# Patient Record
Sex: MALE | Race: ASIAN | Employment: UNEMPLOYED | URBAN - METROPOLITAN AREA
[De-identification: names, ages, dates, MRNs, and addresses within clinical notes are randomized per-mention and may not be internally consistent; named-entity substitution may affect disease eponyms.]

---

## 2019-04-08 ENCOUNTER — OFFICE VISIT (OUTPATIENT)
Dept: FAMILY MEDICINE CLINIC | Age: 39
End: 2019-04-08
Payer: MEDICAID

## 2019-04-08 VITALS
RESPIRATION RATE: 20 BRPM | WEIGHT: 189.8 LBS | OXYGEN SATURATION: 98 % | SYSTOLIC BLOOD PRESSURE: 128 MMHG | HEIGHT: 70 IN | TEMPERATURE: 99.8 F | HEART RATE: 75 BPM | DIASTOLIC BLOOD PRESSURE: 82 MMHG | BODY MASS INDEX: 27.17 KG/M2

## 2019-04-08 DIAGNOSIS — Z79.899 LONG TERM CURRENT USE OF DIURETIC: ICD-10-CM

## 2019-04-08 DIAGNOSIS — G43.009 MIGRAINE WITHOUT AURA AND WITHOUT STATUS MIGRAINOSUS, NOT INTRACTABLE: ICD-10-CM

## 2019-04-08 DIAGNOSIS — Z77.21 EXPOSURE TO POTENTIALLY HAZARDOUS BODY FLUIDS: ICD-10-CM

## 2019-04-08 DIAGNOSIS — Z13.1 SCREENING FOR DIABETES MELLITUS: ICD-10-CM

## 2019-04-08 DIAGNOSIS — Z13.220 SCREENING, LIPID: ICD-10-CM

## 2019-04-08 LAB
ANION GAP SERPL CALCULATED.3IONS-SCNC: 16 MMOL/L (ref 3–16)
BUN BLDV-MCNC: 15 MG/DL (ref 7–20)
CALCIUM SERPL-MCNC: 9.3 MG/DL (ref 8.3–10.6)
CHLORIDE BLD-SCNC: 101 MMOL/L (ref 99–110)
CHOLESTEROL, FASTING: 237 MG/DL (ref 0–199)
CO2: 23 MMOL/L (ref 21–32)
CREAT SERPL-MCNC: 0.9 MG/DL (ref 0.9–1.3)
GFR AFRICAN AMERICAN: >60
GFR NON-AFRICAN AMERICAN: >60
GLUCOSE BLD-MCNC: 90 MG/DL (ref 70–99)
HDLC SERPL-MCNC: 43 MG/DL (ref 40–60)
LDL CHOLESTEROL CALCULATED: 152 MG/DL
POTASSIUM SERPL-SCNC: 4.5 MMOL/L (ref 3.5–5.1)
REASON FOR REJECTION: NORMAL
REJECTED TEST: NORMAL
SODIUM BLD-SCNC: 140 MMOL/L (ref 136–145)
TRIGLYCERIDE, FASTING: 209 MG/DL (ref 0–150)
VLDLC SERPL CALC-MCNC: 42 MG/DL

## 2019-04-08 PROCEDURE — 99203 OFFICE O/P NEW LOW 30 MIN: CPT | Performed by: FAMILY MEDICINE

## 2019-04-08 PROCEDURE — 1036F TOBACCO NON-USER: CPT | Performed by: FAMILY MEDICINE

## 2019-04-08 PROCEDURE — G8427 DOCREV CUR MEDS BY ELIG CLIN: HCPCS | Performed by: FAMILY MEDICINE

## 2019-04-08 PROCEDURE — 36415 COLL VENOUS BLD VENIPUNCTURE: CPT | Performed by: FAMILY MEDICINE

## 2019-04-08 PROCEDURE — G8419 CALC BMI OUT NRM PARAM NOF/U: HCPCS | Performed by: FAMILY MEDICINE

## 2019-04-08 RX ORDER — LISINOPRIL AND HYDROCHLOROTHIAZIDE 25; 20 MG/1; MG/1
1 TABLET ORAL DAILY
COMMUNITY
End: 2019-05-13

## 2019-04-08 RX ORDER — FLUOXETINE HYDROCHLORIDE 20 MG/1
10 CAPSULE ORAL DAILY
COMMUNITY
End: 2020-09-17 | Stop reason: ALTCHOICE

## 2019-04-08 RX ORDER — FLUTICASONE PROPIONATE 50 MCG
2 SPRAY, SUSPENSION (ML) NASAL DAILY
Qty: 1 BOTTLE | Refills: 5 | Status: SHIPPED | OUTPATIENT
Start: 2019-04-08

## 2019-04-08 RX ORDER — QUETIAPINE 200 MG/1
200 TABLET, FILM COATED, EXTENDED RELEASE ORAL NIGHTLY
COMMUNITY
End: 2019-04-23

## 2019-04-08 RX ORDER — CETIRIZINE HYDROCHLORIDE 10 MG/1
10 TABLET ORAL DAILY
COMMUNITY

## 2019-04-08 RX ORDER — SUMATRIPTAN 100 MG/1
TABLET, FILM COATED ORAL
Qty: 9 TABLET | Refills: 3 | Status: SHIPPED | OUTPATIENT
Start: 2019-04-08

## 2019-04-08 RX ORDER — FLUOXETINE 10 MG/1
10 CAPSULE ORAL DAILY
COMMUNITY
End: 2019-04-23 | Stop reason: CLARIF

## 2019-04-08 RX ORDER — FLUTICASONE PROPIONATE 50 MCG
1 SPRAY, SUSPENSION (ML) NASAL DAILY
COMMUNITY
End: 2019-05-13

## 2019-04-08 SDOH — HEALTH STABILITY: MENTAL HEALTH: HOW OFTEN DO YOU HAVE A DRINK CONTAINING ALCOHOL?: MONTHLY OR LESS

## 2019-04-08 SDOH — HEALTH STABILITY: PHYSICAL HEALTH: ON AVERAGE, HOW MANY DAYS PER WEEK DO YOU ENGAGE IN MODERATE TO STRENUOUS EXERCISE (LIKE A BRISK WALK)?: 5 DAYS

## 2019-04-08 SDOH — HEALTH STABILITY: MENTAL HEALTH: HOW MANY STANDARD DRINKS CONTAINING ALCOHOL DO YOU HAVE ON A TYPICAL DAY?: 1 OR 2

## 2019-04-08 ASSESSMENT — PATIENT HEALTH QUESTIONNAIRE - PHQ9: DEPRESSION UNABLE TO ASSESS: PT REFUSES

## 2019-04-08 NOTE — PROGRESS NOTES
Subjective:      Patient ID: John Jo is a 45 y.o. male. HPI  44 yo man with migraines for years but more freq ( 2-3 / mo) since he returned to [facility]. Unilateral, R orbital, no aura, + nausea, phono and photophobia. Uses Exedrin and occ more Tylenol    Has hx of nasal allergies  Gets tension headaches in the back of the head as well but the migraines are distinct  Review of Systems  PMSHx reviewed and/or updated    Objective:   Physical Exam   Constitutional: He appears well-developed and well-nourished. HENT:   Right Ear: Tympanic membrane and ear canal normal.   Left Ear: Tympanic membrane and ear canal normal.   Nose: Nose normal. Right sinus exhibits no maxillary sinus tenderness and no frontal sinus tenderness. Left sinus exhibits no maxillary sinus tenderness and no frontal sinus tenderness. Mouth/Throat: Uvula is midline, oropharynx is clear and moist and mucous membranes are normal.   Invisalign   Eyes: Pupils are equal, round, and reactive to light. Conjunctivae and EOM are normal.   Neurological: He has normal strength. No cranial nerve deficit. He displays a negative Romberg sign. Reflex Scores:       Tricep reflexes are 2+ on the right side and 2+ on the left side. Bicep reflexes are 2+ on the right side and 2+ on the left side. Brachioradialis reflexes are 2+ on the right side and 2+ on the left side. Patellar reflexes are 2+ on the right side and 2+ on the left side.   Cerebellar fn good     Vitals:    04/08/19 1112   BP: 128/82   Site: Left Upper Arm   Position: Sitting   Cuff Size: Medium Adult   Pulse: 75   Resp: 20   Temp: 99.8 °F (37.7 °C)   TempSrc: Oral   SpO2: 98%   Weight: 189 lb 12.8 oz (86.1 kg)   Height: 5' 9.5\" (1.765 m)         Assessment:    Common Migraines    HTN, controlled  Allergic rhinitis      Plan:      Imitrex 100 mg at migraine onset, then 100 mg 2 hrs later  Return for the completion of the exam  Refill other meds as needed        [employer] Fior Martinez MD

## 2019-04-08 NOTE — PATIENT INSTRUCTIONS
Imitrex 100 mg at migraine onset, then 100 mg 2 hrs later  Return for the completion of the exam  Labs   Start Imitrex as needed   refill Flonase

## 2019-04-09 DIAGNOSIS — Z77.21 EXPOSURE TO POTENTIALLY HAZARDOUS BODY FLUIDS: ICD-10-CM

## 2019-04-10 LAB
HIV AG/AB: NORMAL
HIV ANTIGEN: NORMAL
HIV-1 ANTIBODY: NORMAL
HIV-2 AB: NORMAL
HSV 1 GLYCOPROTEIN G AB IGG: 46.6 IV
HSV 2 GLYCOPROTEIN G AB IGG: 0.64 IV

## 2019-04-23 ENCOUNTER — OFFICE VISIT (OUTPATIENT)
Dept: FAMILY MEDICINE CLINIC | Age: 39
End: 2019-04-23
Payer: MEDICAID

## 2019-04-23 VITALS
HEART RATE: 84 BPM | SYSTOLIC BLOOD PRESSURE: 130 MMHG | DIASTOLIC BLOOD PRESSURE: 80 MMHG | OXYGEN SATURATION: 98 % | BODY MASS INDEX: 27.51 KG/M2 | WEIGHT: 189 LBS

## 2019-04-23 DIAGNOSIS — E78.2 MIXED HYPERLIPIDEMIA: ICD-10-CM

## 2019-04-23 DIAGNOSIS — Z77.21 EXPOSURE TO POTENTIALLY HAZARDOUS BODY FLUIDS: ICD-10-CM

## 2019-04-23 DIAGNOSIS — E78.2 MIXED HYPERLIPIDEMIA: Primary | ICD-10-CM

## 2019-04-23 LAB — C-REACTIVE PROTEIN, HIGH SENSITIVITY: 0.75 MG/L (ref 0.16–3)

## 2019-04-23 PROCEDURE — 36415 COLL VENOUS BLD VENIPUNCTURE: CPT | Performed by: FAMILY MEDICINE

## 2019-04-23 PROCEDURE — 99395 PREV VISIT EST AGE 18-39: CPT | Performed by: FAMILY MEDICINE

## 2019-04-23 RX ORDER — EMTRICITABINE AND TENOFOVIR DISOPROXIL FUMARATE 200; 300 MG/1; MG/1
1 TABLET, FILM COATED ORAL DAILY
Qty: 30 TABLET | Refills: 3 | Status: SHIPPED | OUTPATIENT
Start: 2019-04-23 | End: 2019-08-27 | Stop reason: SDUPTHER

## 2019-04-23 RX ORDER — CARIPRAZINE 1.5 MG/1
1.5 CAPSULE, GELATIN COATED ORAL DAILY
COMMUNITY
Start: 2019-04-16 | End: 2020-12-09

## 2019-04-23 ASSESSMENT — PATIENT HEALTH QUESTIONNAIRE - PHQ9
1. LITTLE INTEREST OR PLEASURE IN DOING THINGS: 0
SUM OF ALL RESPONSES TO PHQ QUESTIONS 1-9: 0
SUM OF ALL RESPONSES TO PHQ QUESTIONS 1-9: 0
SUM OF ALL RESPONSES TO PHQ9 QUESTIONS 1 & 2: 0
2. FEELING DOWN, DEPRESSED OR HOPELESS: 0

## 2019-04-23 NOTE — PATIENT INSTRUCTIONS
Get labs  Start Truvada  QVar 40, 1 puff twice daily  Call with a report in 1-2 weeks  Lipid profile in 2-3 mos.

## 2019-04-23 NOTE — PROGRESS NOTES
Subjective:      Patient ID: Fátima Villalba is a 45 y.o. male. HPI  Started Vraylar 8 days ago, and his mood has stabilized. Lately has awakened with wheezing and some resistance to air flow  Review of Systems   Constitutional:        WDWN WM in NAD   Musculoskeletal: Positive for arthralgias (R index finger for the last month). Hematological: Bruises/bleeds easily. Psychiatric/Behavioral: Positive for agitation. Negative for dysphoric mood. Low libido, attrib to Prozac     PMSHx reviewed and/or updated    Objective:   Physical Exam   Constitutional: He is oriented to person, place, and time. He appears well-developed and well-nourished. HENT:   Head: Normocephalic. Right Ear: Tympanic membrane, external ear and ear canal normal.   Left Ear: Tympanic membrane, external ear and ear canal normal.   Nose: Nose normal.   Mouth/Throat: Uvula is midline, oropharynx is clear and moist and mucous membranes are normal. No oropharyngeal exudate, posterior oropharyngeal edema or posterior oropharyngeal erythema. Eyes: Pupils are equal, round, and reactive to light. Conjunctivae and EOM are normal.   Neck: Normal range of motion. Neck supple. Carotid bruit is not present. No thyromegaly present. Cardiovascular: Normal rate, regular rhythm and normal heart sounds. No murmur heard. Pulmonary/Chest: Effort normal and breath sounds normal.   Abdominal: Soft. Bowel sounds are normal. He exhibits no mass. There is no hepatosplenomegaly. There is no tenderness. No hernia. Hernia confirmed negative in the right inguinal area and confirmed negative in the left inguinal area. Genitourinary: Rectum normal, prostate normal, testes normal and penis normal.   Musculoskeletal: Normal range of motion. Joints stable and strong   Lymphadenopathy:     He has no cervical adenopathy. Neurological: He is alert and oriented to person, place, and time. He has normal strength and normal reflexes. No cranial nerve deficit. He displays a negative Romberg sign. Skin: Skin is warm and dry. No rash noted. Psychiatric: He has a normal mood and affect. His behavior is normal.       Assessment:    Well man, contemplating a return to the dating pool        Plan:     Get labs  Start Truvada  QVar 40, 1 puff twice daily  Call with a report in 1-2 weeks  Lipid profile in 2-3 mos.         Danielito Alan MD

## 2019-04-24 LAB — TOTAL SYPHILLIS IGG/IGM: NORMAL

## 2019-05-13 ENCOUNTER — OFFICE VISIT (OUTPATIENT)
Dept: FAMILY MEDICINE CLINIC | Age: 39
End: 2019-05-13
Payer: MEDICAID

## 2019-05-13 VITALS
BODY MASS INDEX: 27.8 KG/M2 | DIASTOLIC BLOOD PRESSURE: 88 MMHG | HEART RATE: 75 BPM | WEIGHT: 191 LBS | SYSTOLIC BLOOD PRESSURE: 138 MMHG

## 2019-05-13 DIAGNOSIS — N62 GYNECOMASTIA, MALE: Primary | ICD-10-CM

## 2019-05-13 DIAGNOSIS — N62 GYNECOMASTIA, MALE: ICD-10-CM

## 2019-05-13 LAB
ALBUMIN SERPL-MCNC: 5 G/DL (ref 3.4–5)
ALP BLD-CCNC: 86 U/L (ref 40–129)
ALT SERPL-CCNC: 30 U/L (ref 10–40)
ANISOCYTOSIS: ABNORMAL
AST SERPL-CCNC: 31 U/L (ref 15–37)
BASOPHILS ABSOLUTE: 0 K/UL (ref 0–0.2)
BASOPHILS RELATIVE PERCENT: 0.2 %
BILIRUB SERPL-MCNC: 0.4 MG/DL (ref 0–1)
BILIRUBIN DIRECT: <0.2 MG/DL (ref 0–0.3)
BILIRUBIN, INDIRECT: NORMAL MG/DL (ref 0–1)
EOSINOPHILS ABSOLUTE: 0.1 K/UL (ref 0–0.6)
EOSINOPHILS RELATIVE PERCENT: 1.7 %
HCT VFR BLD CALC: 38.7 % (ref 40.5–52.5)
HEMATOLOGY PATH CONSULT: YES
HEMOGLOBIN: 12.5 G/DL (ref 13.5–17.5)
LYMPHOCYTES ABSOLUTE: 1.5 K/UL (ref 1–5.1)
LYMPHOCYTES RELATIVE PERCENT: 31.6 %
MCH RBC QN AUTO: 22 PG (ref 26–34)
MCHC RBC AUTO-ENTMCNC: 32.2 G/DL (ref 31–36)
MCV RBC AUTO: 68.3 FL (ref 80–100)
MICROCYTES: ABNORMAL
MONOCYTES ABSOLUTE: 0.3 K/UL (ref 0–1.3)
MONOCYTES RELATIVE PERCENT: 7 %
NEUTROPHILS ABSOLUTE: 2.8 K/UL (ref 1.7–7.7)
NEUTROPHILS RELATIVE PERCENT: 59.5 %
PDW BLD-RTO: 16.9 % (ref 12.4–15.4)
PLATELET # BLD: 181 K/UL (ref 135–450)
PLATELET SLIDE REVIEW: ADEQUATE
PMV BLD AUTO: 9.2 FL (ref 5–10.5)
PROLACTIN: 11.5 NG/ML
RBC # BLD: 5.66 M/UL (ref 4.2–5.9)
SLIDE REVIEW: ABNORMAL
TEAR DROP CELLS: ABNORMAL
TOTAL PROTEIN: 8 G/DL (ref 6.4–8.2)
WBC # BLD: 4.7 K/UL (ref 4–11)

## 2019-05-13 PROCEDURE — 36415 COLL VENOUS BLD VENIPUNCTURE: CPT | Performed by: FAMILY MEDICINE

## 2019-05-13 PROCEDURE — G8428 CUR MEDS NOT DOCUMENT: HCPCS | Performed by: FAMILY MEDICINE

## 2019-05-13 PROCEDURE — G8419 CALC BMI OUT NRM PARAM NOF/U: HCPCS | Performed by: FAMILY MEDICINE

## 2019-05-13 PROCEDURE — 99213 OFFICE O/P EST LOW 20 MIN: CPT | Performed by: FAMILY MEDICINE

## 2019-05-13 PROCEDURE — 1036F TOBACCO NON-USER: CPT | Performed by: FAMILY MEDICINE

## 2019-05-13 RX ORDER — IRBESARTAN AND HYDROCHLOROTHIAZIDE 300; 12.5 MG/1; MG/1
1 TABLET, FILM COATED ORAL DAILY
Qty: 30 TABLET | Refills: 5 | Status: SHIPPED | OUTPATIENT
Start: 2019-05-13 | End: 2019-06-14

## 2019-05-13 NOTE — PROGRESS NOTES
Subjective:      Patient ID: Gianna Fernando is a 45 y.o. male. HPI  Has had large breasts since adolescence, even when he was lean. Review of Systems    Objective:   Physical Exam   Pulmonary/Chest:   Symmetrical enlargement of the breasts    Nontender.  Well defined bud on R, ill defined bud on L       Assessment:     Gynecomastia      Plan:      CBC, LFTs, Prolactin        Anastasia Alves MD

## 2019-05-14 DIAGNOSIS — D50.9 MICROCYTIC ANEMIA: Primary | ICD-10-CM

## 2019-05-14 DIAGNOSIS — D50.9 MICROCYTIC ANEMIA: ICD-10-CM

## 2019-05-14 LAB
HEMATOLOGY PATH CONSULT: NORMAL
IRON SATURATION: 31 % (ref 20–50)
IRON: 99 UG/DL (ref 59–158)
PROLACTIN: 12.2 NG/ML
TOTAL IRON BINDING CAPACITY: 317 UG/DL (ref 260–445)

## 2019-05-21 RX ORDER — FINASTERIDE 1 MG/1
1 TABLET, FILM COATED ORAL DAILY
Qty: 30 TABLET | Refills: 5 | Status: SHIPPED | OUTPATIENT
Start: 2019-05-21 | End: 2019-10-11

## 2019-06-03 ENCOUNTER — NURSE ONLY (OUTPATIENT)
Dept: FAMILY MEDICINE CLINIC | Age: 39
End: 2019-06-03

## 2019-06-03 ENCOUNTER — TELEPHONE (OUTPATIENT)
Dept: FAMILY MEDICINE CLINIC | Age: 39
End: 2019-06-03

## 2019-06-03 VITALS — DIASTOLIC BLOOD PRESSURE: 100 MMHG | SYSTOLIC BLOOD PRESSURE: 148 MMHG

## 2019-06-03 RX ORDER — AMLODIPINE BESYLATE 5 MG/1
5 TABLET ORAL DAILY
Qty: 30 TABLET | Refills: 5 | Status: SHIPPED | OUTPATIENT
Start: 2019-06-03 | End: 2019-10-11

## 2019-06-03 NOTE — TELEPHONE ENCOUNTER
Pt here for BP check, 148/100 X2  Pt states that he received a letter from [employer] stating that the medication you sent in for him was not on his formulary. He states that losartan was. Please advise.

## 2019-06-03 NOTE — TELEPHONE ENCOUNTER
I called in another medicine called amlodipine to take in addition to the medicine you are currently taking.  I hope there is another drug other than losartan that your insurance covers because losartan I believe does not last for a full 24 hours

## 2019-06-12 ENCOUNTER — TELEPHONE (OUTPATIENT)
Dept: FAMILY MEDICINE CLINIC | Age: 39
End: 2019-06-12

## 2019-06-13 ENCOUNTER — TELEPHONE (OUTPATIENT)
Dept: FAMILY MEDICINE CLINIC | Age: 39
End: 2019-06-13

## 2019-06-13 NOTE — TELEPHONE ENCOUNTER
Pt called to say that  He spoke with the Pharmacy today and the Irbesartan  Is not going to approved by the Pharmacy please advise.     :  Ema Tinsley 61, [address] [facility]

## 2019-06-14 ENCOUNTER — TELEPHONE (OUTPATIENT)
Dept: FAMILY MEDICINE CLINIC | Age: 39
End: 2019-06-14

## 2019-06-14 RX ORDER — LISINOPRIL AND HYDROCHLOROTHIAZIDE 25; 20 MG/1; MG/1
1 TABLET ORAL DAILY
Qty: 30 TABLET | Refills: 5 | Status: SHIPPED | OUTPATIENT
Start: 2019-06-14 | End: 2019-10-11

## 2019-06-14 NOTE — TELEPHONE ENCOUNTER
The blood pressure medication I prescribed with not a preferred drug on your insurance company's formulary so I switched it to something that was. The new medicine, lisinopril is very effective at controlling blood pressure but in rather large minority of patients it can cause a cough. If you develop a dry tickly cough in the back of your throat is probably due to the medication.   Either way I would like to see you in the office in 2 to 3 weeks for a blood pressure check

## 2019-08-27 RX ORDER — EMTRICITABINE AND TENOFOVIR DISOPROXIL FUMARATE 200; 300 MG/1; MG/1
TABLET, FILM COATED ORAL
Qty: 30 TABLET | Refills: 2 | Status: SHIPPED | OUTPATIENT
Start: 2019-08-27 | End: 2019-10-11

## 2019-10-11 ENCOUNTER — OFFICE VISIT (OUTPATIENT)
Dept: PRIMARY CARE CLINIC | Age: 39
End: 2019-10-11
Payer: MEDICAID

## 2019-10-11 VITALS
HEART RATE: 75 BPM | WEIGHT: 205 LBS | BODY MASS INDEX: 29.84 KG/M2 | SYSTOLIC BLOOD PRESSURE: 120 MMHG | DIASTOLIC BLOOD PRESSURE: 80 MMHG

## 2019-10-11 DIAGNOSIS — Z23 NEEDS FLU SHOT: Primary | ICD-10-CM

## 2019-10-11 PROCEDURE — G8419 CALC BMI OUT NRM PARAM NOF/U: HCPCS | Performed by: FAMILY MEDICINE

## 2019-10-11 PROCEDURE — 99214 OFFICE O/P EST MOD 30 MIN: CPT | Performed by: FAMILY MEDICINE

## 2019-10-11 PROCEDURE — G8427 DOCREV CUR MEDS BY ELIG CLIN: HCPCS | Performed by: FAMILY MEDICINE

## 2019-10-11 PROCEDURE — 90686 IIV4 VACC NO PRSV 0.5 ML IM: CPT | Performed by: FAMILY MEDICINE

## 2019-10-11 PROCEDURE — 1036F TOBACCO NON-USER: CPT | Performed by: FAMILY MEDICINE

## 2019-10-11 PROCEDURE — G8482 FLU IMMUNIZE ORDER/ADMIN: HCPCS | Performed by: FAMILY MEDICINE

## 2019-10-11 PROCEDURE — 90471 IMMUNIZATION ADMIN: CPT | Performed by: FAMILY MEDICINE

## 2019-10-11 RX ORDER — FINASTERIDE 1 MG/1
1 TABLET, FILM COATED ORAL DAILY
Qty: 90 TABLET | Refills: 3 | Status: SHIPPED | OUTPATIENT
Start: 2019-10-11 | End: 2020-09-17 | Stop reason: ALTCHOICE

## 2019-10-11 RX ORDER — EMTRICITABINE AND TENOFOVIR DISOPROXIL FUMARATE 200; 300 MG/1; MG/1
1 TABLET, FILM COATED ORAL DAILY
Qty: 90 TABLET | Refills: 3 | Status: SHIPPED | OUTPATIENT
Start: 2019-10-11 | End: 2020-09-17 | Stop reason: ALTCHOICE

## 2019-10-11 RX ORDER — CLONIDINE HYDROCHLORIDE 0.1 MG/1
0.1 TABLET ORAL 2 TIMES DAILY
COMMUNITY
Start: 2019-10-10 | End: 2020-08-25 | Stop reason: SDUPTHER

## 2019-10-11 RX ORDER — AMLODIPINE BESYLATE 5 MG/1
5 TABLET ORAL DAILY
Qty: 90 TABLET | Refills: 1 | Status: SHIPPED | OUTPATIENT
Start: 2019-10-11 | End: 2020-12-09

## 2019-10-11 RX ORDER — VALSARTAN AND HYDROCHLOROTHIAZIDE 160; 25 MG/1; MG/1
1 TABLET ORAL DAILY
Qty: 30 TABLET | Refills: 0 | Status: SHIPPED | OUTPATIENT
Start: 2019-10-11 | End: 2019-10-15

## 2019-10-14 RX ORDER — AMLODIPINE BESYLATE 5 MG/1
TABLET ORAL
Qty: 90 TABLET | Refills: 1 | Status: SHIPPED | OUTPATIENT
Start: 2019-10-14 | End: 2020-08-25 | Stop reason: SDUPTHER

## 2019-10-15 RX ORDER — LOSARTAN POTASSIUM AND HYDROCHLOROTHIAZIDE 25; 100 MG/1; MG/1
1 TABLET ORAL DAILY
Qty: 30 TABLET | Refills: 2 | Status: SHIPPED | OUTPATIENT
Start: 2019-10-15 | End: 2020-08-25 | Stop reason: SDUPTHER

## 2020-08-25 ENCOUNTER — E-VISIT (OUTPATIENT)
Dept: PRIMARY CARE CLINIC | Age: 40
End: 2020-08-25
Payer: MEDICAID

## 2020-08-25 PROCEDURE — 99421 OL DIG E/M SVC 5-10 MIN: CPT | Performed by: FAMILY MEDICINE

## 2020-08-25 RX ORDER — AMLODIPINE BESYLATE 5 MG/1
5 TABLET ORAL DAILY
Qty: 90 TABLET | Refills: 0 | Status: SHIPPED | OUTPATIENT
Start: 2020-08-25 | End: 2021-01-11 | Stop reason: SDUPTHER

## 2020-08-25 RX ORDER — LOSARTAN POTASSIUM AND HYDROCHLOROTHIAZIDE 25; 100 MG/1; MG/1
1 TABLET ORAL DAILY
Qty: 90 TABLET | Refills: 0 | Status: SHIPPED | OUTPATIENT
Start: 2020-08-25 | End: 2020-12-09

## 2020-08-25 RX ORDER — CLONIDINE HYDROCHLORIDE 0.1 MG/1
0.1 TABLET ORAL 2 TIMES DAILY
Qty: 180 TABLET | Refills: 0 | Status: SHIPPED | OUTPATIENT
Start: 2020-08-25 | End: 2020-12-09

## 2020-08-26 NOTE — PROGRESS NOTES
I will refill his blood pressure medication, and send it to [facility] in [place] on [address]

## 2020-09-17 ENCOUNTER — OFFICE VISIT (OUTPATIENT)
Dept: PRIMARY CARE CLINIC | Age: 40
End: 2020-09-17
Payer: MEDICAID

## 2020-09-17 VITALS
TEMPERATURE: 97.6 F | DIASTOLIC BLOOD PRESSURE: 87 MMHG | SYSTOLIC BLOOD PRESSURE: 136 MMHG | WEIGHT: 218 LBS | BODY MASS INDEX: 31.73 KG/M2 | HEART RATE: 79 BPM

## 2020-09-17 DIAGNOSIS — K52.9 CHRONIC DIARRHEA OF UNKNOWN ORIGIN: ICD-10-CM

## 2020-09-17 LAB
ALBUMIN SERPL-MCNC: 4.6 G/DL (ref 3.4–5)
ALP BLD-CCNC: 91 U/L (ref 40–129)
ALT SERPL-CCNC: 23 U/L (ref 10–40)
AST SERPL-CCNC: 23 U/L (ref 15–37)
BASOPHILS ABSOLUTE: 0 K/UL (ref 0–0.2)
BASOPHILS RELATIVE PERCENT: 0.6 %
BILIRUB SERPL-MCNC: 0.3 MG/DL (ref 0–1)
BILIRUBIN DIRECT: <0.2 MG/DL (ref 0–0.3)
BILIRUBIN, INDIRECT: NORMAL MG/DL (ref 0–1)
EOSINOPHILS ABSOLUTE: 0.1 K/UL (ref 0–0.6)
EOSINOPHILS RELATIVE PERCENT: 1.8 %
HCT VFR BLD CALC: 39.3 % (ref 40.5–52.5)
HEMATOLOGY PATH CONSULT: NO
HEMOGLOBIN: 12.3 G/DL (ref 13.5–17.5)
LYMPHOCYTES ABSOLUTE: 1.7 K/UL (ref 1–5.1)
LYMPHOCYTES RELATIVE PERCENT: 30.5 %
MCH RBC QN AUTO: 21.1 PG (ref 26–34)
MCHC RBC AUTO-ENTMCNC: 31.3 G/DL (ref 31–36)
MCV RBC AUTO: 67.5 FL (ref 80–100)
MONOCYTES ABSOLUTE: 0.3 K/UL (ref 0–1.3)
MONOCYTES RELATIVE PERCENT: 5.3 %
NEUTROPHILS ABSOLUTE: 3.4 K/UL (ref 1.7–7.7)
NEUTROPHILS RELATIVE PERCENT: 61.8 %
PDW BLD-RTO: 15.6 % (ref 12.4–15.4)
PLATELET # BLD: 177 K/UL (ref 135–450)
PMV BLD AUTO: 9 FL (ref 5–10.5)
RBC # BLD: 5.82 M/UL (ref 4.2–5.9)
TOTAL PROTEIN: 7.3 G/DL (ref 6.4–8.2)
WBC # BLD: 5.5 K/UL (ref 4–11)

## 2020-09-17 PROCEDURE — 1036F TOBACCO NON-USER: CPT | Performed by: FAMILY MEDICINE

## 2020-09-17 PROCEDURE — G8417 CALC BMI ABV UP PARAM F/U: HCPCS | Performed by: FAMILY MEDICINE

## 2020-09-17 PROCEDURE — 99214 OFFICE O/P EST MOD 30 MIN: CPT | Performed by: FAMILY MEDICINE

## 2020-09-17 PROCEDURE — G8427 DOCREV CUR MEDS BY ELIG CLIN: HCPCS | Performed by: FAMILY MEDICINE

## 2020-09-17 RX ORDER — ZOLPIDEM TARTRATE 10 MG/1
10 TABLET ORAL
COMMUNITY
Start: 2020-07-30 | End: 2021-02-04 | Stop reason: SDUPTHER

## 2020-09-17 RX ORDER — ELUXADOLINE 100 MG/1
100 TABLET, FILM COATED ORAL 2 TIMES DAILY
Qty: 60 TABLET | Refills: 2 | Status: SHIPPED | OUTPATIENT
Start: 2020-09-17 | End: 2020-09-22

## 2020-09-17 RX ORDER — ESCITALOPRAM OXALATE 10 MG/1
20 TABLET ORAL
COMMUNITY
Start: 2020-02-03 | End: 2021-02-04

## 2020-09-17 SDOH — ECONOMIC STABILITY: FOOD INSECURITY: WITHIN THE PAST 12 MONTHS, YOU WORRIED THAT YOUR FOOD WOULD RUN OUT BEFORE YOU GOT MONEY TO BUY MORE.: NEVER TRUE

## 2020-09-17 SDOH — ECONOMIC STABILITY: FOOD INSECURITY: WITHIN THE PAST 12 MONTHS, THE FOOD YOU BOUGHT JUST DIDN'T LAST AND YOU DIDN'T HAVE MONEY TO GET MORE.: NEVER TRUE

## 2020-09-17 SDOH — ECONOMIC STABILITY: INCOME INSECURITY: HOW HARD IS IT FOR YOU TO PAY FOR THE VERY BASICS LIKE FOOD, HOUSING, MEDICAL CARE, AND HEATING?: NOT HARD AT ALL

## 2020-09-17 ASSESSMENT — PATIENT HEALTH QUESTIONNAIRE - PHQ9
1. LITTLE INTEREST OR PLEASURE IN DOING THINGS: 0
SUM OF ALL RESPONSES TO PHQ QUESTIONS 1-9: 0
2. FEELING DOWN, DEPRESSED OR HOPELESS: 0
SUM OF ALL RESPONSES TO PHQ9 QUESTIONS 1 & 2: 0
SUM OF ALL RESPONSES TO PHQ QUESTIONS 1-9: 0

## 2020-09-17 NOTE — PROGRESS NOTES
Subjective:      Patient ID: Freddie Sy is a 44 y.o. male. HPI  Has had rectal bleeding off and on for years, had a neg colonoscopy last year. Episodes will last for ~ 1 month then stop. This one has been persisting for~ 3 weeks, no pain with defecation. Has used PPI    Bloating , belching,upper abd discomfort 4 day. Sl N only. Fecal urgency and frequency for 4 days. Has tried gas-x, Pepcid, and a laxative, but no better    Review of Systems    Objective:   Physical Exam  Constitutional:       Appearance: Normal appearance. He is normal weight. HENT:      Mouth/Throat:      Lips: Pink. Mouth: Mucous membranes are moist.      Tongue: No lesions. Pharynx: Oropharynx is clear. Cardiovascular:      Rate and Rhythm: Normal rate and regular rhythm. Heart sounds: No murmur. Pulmonary:      Effort: Pulmonary effort is normal.      Breath sounds: Normal breath sounds. Abdominal:      General: Bowel sounds are decreased. Palpations: Abdomen is soft. There is no hepatomegaly or splenomegaly. Tenderness: There is abdominal tenderness in the epigastric area. Genitourinary:     Prostate: Normal.      Rectum: Internal hemorrhoid present.       Comments: Anoscopy reveals int hemorrhoids      Vitals:    09/17/20 0850   BP: 136/87   Pulse: 79   Temp: 97.6 °F (36.4 °C)   Weight: 218 lb (98.9 kg)         Assessment:    Chronic loose stools  Internal hemorrhoids, bleeding  Exclude malabsorption      Plan:    LFTs  CBC  Transmaminase  *Beano 2 tabs with each meal for 3 days  Viberzi 100 mg twice daily  Refer GI         Darren Gottron, MD

## 2020-09-22 ENCOUNTER — TELEPHONE (OUTPATIENT)
Dept: PRIMARY CARE CLINIC | Age: 40
End: 2020-09-22

## 2020-09-22 RX ORDER — DIPHENOXYLATE HYDROCHLORIDE AND ATROPINE SULFATE 2.5; .025 MG/1; MG/1
1-2 TABLET ORAL 4 TIMES DAILY PRN
Qty: 60 TABLET | Refills: 0 | Status: SHIPPED | OUTPATIENT
Start: 2020-09-22 | End: 2020-10-07

## 2020-09-23 NOTE — TELEPHONE ENCOUNTER
Before your insurance will pay for Viberzi you have to try Lomotil or another generic first.  I called in some Lomotil for you, the dose is 1 or 2 tablets 4 times per day. If you need it for a longer period of time let me know.

## 2020-09-28 ENCOUNTER — OFFICE VISIT (OUTPATIENT)
Dept: ORTHOPEDIC SURGERY | Age: 40
End: 2020-09-28
Payer: MEDICAID

## 2020-09-28 VITALS — WEIGHT: 218.03 LBS | BODY MASS INDEX: 32.29 KG/M2 | HEIGHT: 69 IN | TEMPERATURE: 98.1 F

## 2020-09-28 PROCEDURE — G8417 CALC BMI ABV UP PARAM F/U: HCPCS | Performed by: INTERNAL MEDICINE

## 2020-09-28 PROCEDURE — 99203 OFFICE O/P NEW LOW 30 MIN: CPT | Performed by: INTERNAL MEDICINE

## 2020-09-28 PROCEDURE — G8427 DOCREV CUR MEDS BY ELIG CLIN: HCPCS | Performed by: INTERNAL MEDICINE

## 2020-09-28 PROCEDURE — 1036F TOBACCO NON-USER: CPT | Performed by: INTERNAL MEDICINE

## 2020-09-28 NOTE — PROGRESS NOTES
Chief Complaint:   Chief Complaint   Patient presents with    Ankle Pain     left, rolled ankle in March 2020, now pain w/running up to 7/10, medial ankle          History of Present Illness:       Patient is a 36 y.o. male presents with the above complaint. The symptoms began worsening t. Pain localizes to the Medial aspect and  is not predictably aggravated by weightbearing activity of navigating stairs and impact activity. There are not mechanical symptoms associated with the symptoms. There are notneuritic symptoms involving the foot or ankle. The patient admits to subjective instability about the foot or ankle and denies new onset or progressive weakness of the lower extremity. Pain level 7    The patient denies a pattern of activity related swelling. Treatment to date: Active rest avoidance of impact activity    There is prior history of foot or ankle trauma related to stress fracture of the heel. There is no prior history of autoimmune disease, crystal arthropathy, or crystal arthropathy. Past Medical History:        Past Medical History:   Diagnosis Date    Asthma 1999    exercise or allergens    Depression 01/2019    Fluoxetine    Essential hypertension 2003    Gastric ulcer 2019    H. pylori, had EGD, then H. pylori treated. Neg colonoscopy    Migraine 1998    Panic disorder with agoraphobia 01/2019    Thalassemia minor        No past surgical history on file. Present Medications:         Current Outpatient Medications   Medication Sig Dispense Refill    diphenoxylate-atropine (DIPHENATOL) 2.5-0.025 MG per tablet Take 1-2 tablets by mouth 4 times daily as needed for Diarrhea for up to 15 days.  60 tablet 0    escitalopram (LEXAPRO) 10 MG tablet 10 mg      zolpidem (AMBIEN) 10 MG tablet 10 mg.      losartan-hydroCHLOROthiazide (HYZAAR) 100-25 MG per tablet Take 1 tablet by mouth daily 90 tablet 0    amLODIPine (NORVASC) 5 MG tablet Take 1 tablet by mouth daily 90 tobacco: Never Used   Substance and Sexual Activity    Alcohol use: Yes     Frequency: Monthly or less     Drinks per session: 1 or 2     Binge frequency: Monthly    Drug use: Not Currently    Sexual activity: Not Currently     Partners: Male   Lifestyle    Physical activity     Days per week: 5 days     Minutes per session: Not on file    Stress: Not on file   Relationships    Social connections     Talks on phone: Not on file     Gets together: Not on file     Attends Mu-ism service: Not on file     Active member of club or organization: Not on file     Attends meetings of clubs or organizations: Not on file     Relationship status: Not on file    Intimate partner violence     Fear of current or ex partner: Not on file     Emotionally abused: Not on file     Physically abused: Not on file     Forced sexual activity: Not on file   Other Topics Concern    Not on file   Social History Narrative    [place], [place], [place], [address]    5 times per week in the gym, and cross fit        Review of Symptoms:    Pertinent items are noted in HPI    Review of systems reviewed from Patient History Form dated on today's date and   available in the patient's chart under the Media tab. Vital Signs:      Vitals:    09/28/20 1525   Temp: 98.1 °F (36.7 °C)        General Exam:     Constitutional: Patient is adequately groomed with no evidence of malnutrition  Mental Status: The patient is oriented to time, place and person. The patient's mood and affect are appropriate. Vascular: Examination reveals no swelling or calf tenderness. Peripheral pulses are palpable and 2+.     Lymphatics: no lymphadenopathy of the inguinal region or lower extremity      Physical Exam: left ankle      Primary Exam:    Inspection: Mild to moderate pes planus and pronation there is suggestion of mild asymmetric atrophy of the left gastrocnemius      Palpation: There is tenderness of the medial talus region      Range of Motion: Dorsiflexion 0 degrees bilaterally      Strength: Normal at the ankle      Special Tests: Bilateral heel rise good strength without pain; anterior drawer talar tilt stable the ankle stable to valgus stressing and symmetric      Skin: There are no rashes, ulcerations or lesions. Gait: Nonantalgic      Reflex intact lower     Additional Comments:        Additional Examinations:           Right Lower Extremity: Examination of the right lower extremity does not show any tenderness, deformity or injury. Range of motion is unremarkable. There is no gross instability. There are no rashes, ulcerations or lesions. Strength and tone are normal.   Neurolgic -Light touch sensation and manual muscle testing normal L2-S1. No fasiculations. Pattella tendon and Achilles tendon reflexes +2 bilaterally. Seated SLR negative        Office Imaging Results/Procedures PerformedToday:      Radiology:      X-rays obtained and reviewed in office:   Views 3 views left ankle   Location left ankle   Impression there is neutral foot morphology incidental note of plantar calcaneal spur. There is spurring dorsally from the neck of the talus on the lateral projection ankle mortise is anatomic. Joint space of the talocrural joint is normal.  Without evidence of discrete OCD lesion. .  No other soft tissue or osseous abnormalities       Office Procedures:     Orders Placed This Encounter   Procedures    XR ANKLE LEFT (MIN 3 VIEWS)     Standing Status:   Future     Number of Occurrences:   1     Standing Expiration Date:   9/28/2021     Order Specific Question:   Reason for exam:     Answer:   pain           Other Outside Imaging and Testing Personally Reviewed:    Xr Ankle Left (min 3 Views)    Result Date: 9/28/2020  Radiology exam is complete. No Radiologist dictation. Please follow up with ordering provider. Assessment   Impression: . Encounter Diagnoses   Name Primary?     Osteochondral talar dome lesion Yes

## 2020-10-13 ENCOUNTER — OFFICE VISIT (OUTPATIENT)
Dept: ORTHOPEDIC SURGERY | Age: 40
End: 2020-10-13
Payer: MEDICAID

## 2020-10-13 ENCOUNTER — TELEPHONE (OUTPATIENT)
Dept: ORTHOPEDIC SURGERY | Age: 40
End: 2020-10-13

## 2020-10-13 VITALS — TEMPERATURE: 97.6 F | BODY MASS INDEX: 32.29 KG/M2 | HEIGHT: 69 IN | WEIGHT: 218.03 LBS

## 2020-10-13 PROCEDURE — G8427 DOCREV CUR MEDS BY ELIG CLIN: HCPCS | Performed by: INTERNAL MEDICINE

## 2020-10-13 PROCEDURE — 1036F TOBACCO NON-USER: CPT | Performed by: INTERNAL MEDICINE

## 2020-10-13 PROCEDURE — G8484 FLU IMMUNIZE NO ADMIN: HCPCS | Performed by: INTERNAL MEDICINE

## 2020-10-13 PROCEDURE — L4360 PNEUMAT WALKING BOOT PRE CST: HCPCS | Performed by: INTERNAL MEDICINE

## 2020-10-13 PROCEDURE — G8417 CALC BMI ABV UP PARAM F/U: HCPCS | Performed by: INTERNAL MEDICINE

## 2020-10-13 PROCEDURE — 99213 OFFICE O/P EST LOW 20 MIN: CPT | Performed by: INTERNAL MEDICINE

## 2020-10-13 NOTE — PROGRESS NOTES
of Breath (Patient not taking: Reported on 9/17/2020) 1 Inhaler 0    VRAYLAR 1.5 MG capsule Take 1.5 mg by mouth daily      cetirizine (ZYRTEC ALLERGY) 10 MG tablet Take 10 mg by mouth daily      Multiple Vitamins-Minerals (MULTIVITAMIN ADULTS PO) Take 1 tablet by mouth daily      SUMAtriptan (IMITREX) 100 MG tablet 1 tab at migraine onset. May repeat in 2 hrs but don't exceed 2 per 24 hrs 9 tablet 3    fluticasone (FLONASE) 50 MCG/ACT nasal spray 2 sprays by Nasal route daily 1 Bottle 5     No current facility-administered medications for this visit. Allergies: Allergies   Allergen Reactions    Oxycontin [Oxycodone Hcl] Nausea Only    Percocet [Oxycodone-Acetaminophen] Nausea Only           Review of Systems:    Pertinent items are noted in HPI        Vital Signs:      Vitals:    10/13/20 1532   Temp: 97.6 °F (36.4 °C)        General Exam:     Constitutional: Patient is adequately groomed with no evidence of malnutrition    Physical Exam: left ankle      Primary Exam:    Inspection: No deformity atrophy appreciable effusion      Palpation: No focal tenderness      Range of Motion: Full range symmetric      Strength: Normal without pain      Special Tests: ATFL and CFL stable      Skin: There are no rashes, ulcerations or lesions.       Gait: Nonantalgic     Neurovascular - non focal and intact       Additional Comments:        Additional Examinations:                    Office Imaging Results/Procedures PerformedToday:            Office Procedures:     Orders Placed This Encounter   Procedures   Nichol Gilbert MD, Orthopedic Surgery (Foot, Ankle), [facility]     Referral Priority:   Routine     Referral Type:   Eval and Treat     Referral Reason:   Specialty Services Required     Referred to Provider:   Sae Malave MD     Requested Specialty:   Orthopedic Surgery     Number of Visits Requested:   1    Breg / Dirk [place] Walking Boot     Patient was prescribed a Tall Walking Boot. The left ankle will require stabilization / immobilization from this semi-rigid / rigid orthosis to improve their function. The orthosis will assist in protecting the affected area, provide functional support and facilitate healing. Patient was instructed to progress ambulation weight bearing as tolerated in the device. The patient was educated and fit by a healthcare professional with expert knowledge and specialization in brace application while under the direct supervision of the physician. Verbal and written instructions for the use of and application of this item were provided. They were instructed to contact the office immediately should the brace result in increased pain, decreased sensation, increased swelling or worsening of the condition. Other Outside Imaging and Testing Personally Reviewed:    Mri Lower Extremity W Jt Wo Contrast    Result Date: 10/10/2020  [facility] #: [id number] #: [id number] Procedure: MR Left Ankle w/o Contrast ; Reason for Exam: Dx, Talar dome osteochondral defect This document is confidential medical information. Unauthorized disclosure or use of this information is prohibited by law. If you are not the intended recipient of this document, please advise us by calling immediately [phone]. [facility], [facility] Willy Cartagena Patient Name: Cornelia Real Case ID: [id number] Patient : 1980 Referring Physician: Skyler Fleming MD Exam Date: 10/08/2020 Exam Description: MR Left Ankle w/o Contrast HISTORY:  OCD, pain medially. TECHNICAL FACTORS:  Long- and short-axis fat- and water-weighted images were performed. COMPARISON:  None. FINDINGS:  Scarred lateral ligament complexes. Tendinopathic distal tibialis posterior tendon. Fibers frayed. Os tibiale externum. Tibialis  anterior tendon intact. Peroneus longus and brevis tendons are intact. Achilles insertion intact.  Tibiotalar capsulitis. 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 The OCD 1.7  x 1.1 cm. Cartilage ulceration. The cortex slightly lifted. Subchondral pseudocysts and marrow reaction. No coalition. No soft tissue mass. CONCLUSION: 1. 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 Subcortical microfracturing and pseudocysts. Mild marrow reaction medial malleolus and nonweightbearing medial talus related to  repetitive microtrauma. 2. Scarred lateral ligament complex. No acute ankle sprain. 3. Tendinopathic and frayed distal tibialis posterior tendon. Tenosynovitis. 4. Please see above. Thank you for the opportunity to provide your interpretation. Wylie Najjar, MD A: AO/ct 10/10/2020 10:58 AM T: CT 10/10/2020 10:20 AM              Assessment   Impression: . Encounter Diagnosis   Name Primary?  Osteochondral talar dome lesion Yes              Plan: Active rest avoidance of impact activities  Brace boot immobilization PRN for escalating pain  Surgical consultation foot and ankle specialty surgery- for consideration of surgical intervention           Orders:        Orders Placed This Encounter   Procedures   Catalina Haddad MD, Orthopedic Surgery (Foot, Ankle), [facility]     Referral Priority:   Routine     Referral Type:   Eval and Treat     Referral Reason:   Specialty Services Required     Referred to Provider:   Joanna Ricketts MD     Requested Specialty:   Orthopedic Surgery     Number of Visits Requested:   1    Luz Maria / Mark Cardenas Walking Boot     Patient was prescribed a Tall Walking Boot. The left ankle will require stabilization / immobilization from this semi-rigid / rigid orthosis to improve their function. The orthosis will assist in protecting the affected area, provide functional support and facilitate healing. Patient was instructed to progress ambulation weight bearing as tolerated in the device.      The patient was educated and fit by a healthcare professional with expert knowledge and specialization in brace application while under the direct supervision of the physician. Verbal and written instructions for the use of and application of this item were provided. They were instructed to contact the office immediately should the brace result in increased pain, decreased sensation, increased swelling or worsening of the condition. Summer Díaz MD.      Disclaimer: \"This note was dictated with voice recognition software. Though review and correction are routine, we apologize for any errors. \"

## 2020-10-14 ENCOUNTER — OFFICE VISIT (OUTPATIENT)
Dept: ORTHOPEDIC SURGERY | Age: 40
End: 2020-10-14
Payer: MEDICAID

## 2020-10-14 VITALS — WEIGHT: 218 LBS | HEIGHT: 69 IN | BODY MASS INDEX: 32.29 KG/M2

## 2020-10-14 PROCEDURE — G8484 FLU IMMUNIZE NO ADMIN: HCPCS | Performed by: ORTHOPAEDIC SURGERY

## 2020-10-14 PROCEDURE — G8417 CALC BMI ABV UP PARAM F/U: HCPCS | Performed by: ORTHOPAEDIC SURGERY

## 2020-10-14 PROCEDURE — G8427 DOCREV CUR MEDS BY ELIG CLIN: HCPCS | Performed by: ORTHOPAEDIC SURGERY

## 2020-10-14 PROCEDURE — 1036F TOBACCO NON-USER: CPT | Performed by: ORTHOPAEDIC SURGERY

## 2020-10-14 PROCEDURE — 99204 OFFICE O/P NEW MOD 45 MIN: CPT | Performed by: ORTHOPAEDIC SURGERY

## 2020-10-14 NOTE — PROGRESS NOTES
Chief Complaint    New Patient (Left Ankle - Osteochondral talar dome lesion)      History of Present Illness:  Teofilo Zaldivar is a 36 y.o. male who I was asked to see in consultation by Dr. Macri Boykin for evaluation of left ankle pain. This started approximately June when he rolled his ankle. The symptoms are worse with activity and better with rest. Patient endorses anteromedial ankle pain but denies numbness tingling. Treatment to date includes: None. He does come in with x-rays and MRIs consistent with medial talar OCD lesion. The patient works as a marathon running . Medical History:  Patient's medications, allergies, past medical, surgical, social and family histories were reviewed and updated as appropriate. Review of Systems:  Relevant review of systems reviewed and available in the patient's chart. They are negative or noncontributory except as per HPI. Vital Signs: There were no vitals filed for this visit. General: well-developed, well-nourished  CV: RR  RESP: Respirations unlabored on RA  Skin: no rashes or ulcerations appreciated  Psych: appropriate mood and affect  Musculoskeletal:    Extremity: Left lower extremity    Inspection: No swelling or ecchymosis    Palpation: Tenderness to palpation about the anteromedial joint. No tenderness over posterior tibialis tendon. Range of Motion: Full ankle range of motion    Stability: Ankle stable    Strength: 5 out of 5 through    Special Tests: None    Gait: Normal    Pulse/perfusion: 2+ chest pedis pulse foot warm well perfused    Neurological:    Sensation: Sensation intact light touch throughout the left lower extremity    Radiology:     X-rays obtained and reviewed in office:  No new x-rays were obtained today but outside radiographs and MRI were reviewed. These show 1 x 1-1/2 cm medial talar OCD shoulder lesion with subchondral cyst formation is also appreciate on the radiographs.   There is also some bony edema in the medial malleolus and nonweightbearing portion of the medial talus. Assessment : 51-year-old male with painful OCD lesion of the left ankle the medial shoulder of the talus    Impression:  Encounter Diagnoses   Name Primary?  Acute left ankle pain Yes    Chronic pain of left ankle     Osteochondral talar dome lesion        Office Procedures:  Orders Placed This Encounter   Procedures    [facility]     Referral Priority:   Routine     Referral Type:   Eval and Treat     Referral Reason:   Specialty Services Required     Requested Specialty:   Physical Therapy     Number of Visits Requested:   1    [place] ARTHROCENTESIS ASPIR&/INJ INTERM JT/BURS W/O US     THIS WILL BE DONE @ [facility] IN THE RADIOLOGY DEPT IN THE [facility] ROOM    PATIENT WILL CALL TO SCHEDULE    INJECTION WILL BE 2cc OF LIDOCAINE AND 1cc OF KENALOG IN THE LEFT ANKLE BY THE MD       Treatment Plan:      I had a nice discussion with the patient today. This problem poses numerous issues related both the patient age and the location of the lesion. The other question here is the chronicity of the injury. He did roll his ankle in June but was not a particularly significant injury and he did not need to seek further care take any time off training. I do suspect that this may be an old injury that is now starting to become symptomatic potentially because of overuse and his high training regimen. Furthermore given his age and location of the lesion I am not confident that he would have an excellent result from a surgical standpoint. He does not have symptoms every day and he has no mechanical symptoms. Furthermore given the bony edema edema on the tibial and talar side of this I do wonder how much of this is more acute stress type reaction that may just be in fact unrelated to a likely older OCD lesion.     To that end I would like to see if we get the symptoms to calm down and then have him gradually return to activity and see if the symptoms resolve. We will do this through physical therapy activity modification to only low impact type activities, elliptical, cycling, walking, swimming. Additionally I would like to schedule him for a left ankle steroid injection. I think that that will help calm things down. Either way I would like to follow this to make sure that this lesion is not worsening. Depending on how he does we may have a discussion about surgery sooner rather than later but I did discuss with him that surgical treatment of shoulder lesions is particularly difficult and his outcome may be certainly variable. More likely I would like to follow the lesion and see if there is progression over 6-month period of time that would encourage sooner intervention at a later. Patient understands the natural history of difficulties in dealing this type of lesion in particular. I will see him back for the injection will be done under fluoroscopy and then likely follow-up with him about 4 to 6 weeks after that to see how he is doing. Greater than 45 minutes was spent in direct face-to-face discussion and evaluation of this complex patient problem.

## 2020-12-09 ENCOUNTER — OFFICE VISIT (OUTPATIENT)
Dept: PRIMARY CARE CLINIC | Age: 40
End: 2020-12-09
Payer: COMMERCIAL

## 2020-12-09 VITALS
BODY MASS INDEX: 33.03 KG/M2 | TEMPERATURE: 98 F | OXYGEN SATURATION: 96 % | RESPIRATION RATE: 12 BRPM | HEIGHT: 69 IN | SYSTOLIC BLOOD PRESSURE: 142 MMHG | HEART RATE: 99 BPM | DIASTOLIC BLOOD PRESSURE: 87 MMHG | WEIGHT: 223 LBS

## 2020-12-09 PROCEDURE — 90471 IMMUNIZATION ADMIN: CPT | Performed by: FAMILY MEDICINE

## 2020-12-09 PROCEDURE — 99213 OFFICE O/P EST LOW 20 MIN: CPT | Performed by: FAMILY MEDICINE

## 2020-12-09 PROCEDURE — 90686 IIV4 VACC NO PRSV 0.5 ML IM: CPT | Performed by: FAMILY MEDICINE

## 2020-12-09 RX ORDER — VALSARTAN AND HYDROCHLOROTHIAZIDE 320; 25 MG/1; MG/1
1 TABLET, FILM COATED ORAL DAILY
Qty: 90 TABLET | Refills: 3 | Status: SHIPPED | OUTPATIENT
Start: 2020-12-09 | End: 2021-03-02 | Stop reason: SDUPTHER

## 2020-12-09 NOTE — PROGRESS NOTES
Subjective:      Patient ID: Teofilo Zaldivar is a 36 y.o. male. HPI   For the last 6-7 years he'd drop his sugar after a meal. Will start to sweat, then  Shake, and feel LHed and irritable. Usually treatable with sugar, but 2 days ago the sensation recurred . Had a workup by endocrine in 2014-5, and all was nl    Review of Systems    Objective:   Physical Exam  Constitutional:       Appearance: Normal appearance. HENT:      Head: Normocephalic and atraumatic. Neck:      Thyroid: No thyromegaly. Vascular: No carotid bruit. Cardiovascular:      Rate and Rhythm: Normal rate and regular rhythm. Heart sounds: No murmur. Pulmonary:      Effort: Pulmonary effort is normal.      Breath sounds: Normal breath sounds. Abdominal:      General: Bowel sounds are normal.      Palpations: Abdomen is soft. There is no hepatomegaly or splenomegaly. Tenderness: There is no abdominal tenderness. Musculoskeletal:      Right lower leg: No edema. Left lower leg: No edema. Lymphadenopathy:      Cervical: No cervical adenopathy. Neurological:      Mental Status: He is alert.    Psychiatric:         Mood and Affect: Mood normal.         Behavior: Behavior normal.       Vitals:    12/09/20 1546   BP: (!) 142/87   Site: Left Upper Arm   Position: Sitting   Cuff Size: Large Adult   Pulse: 99   Resp: 12   Temp: 98 °F (36.7 °C)   TempSrc: Temporal   SpO2: 96%   Weight: 223 lb (101.2 kg)   Height: 5' 9\" (1.753 m)       Assessment:    reactive hypoglycemia        Plan:      Come to the office and try to provoke an attack, then get a glucose and an insulin level    In the meantime, always eat some protein with your carbs    Malorie Malhotra MD

## 2020-12-09 NOTE — PROGRESS NOTES
Vaccine Information Sheet, \"Influenza - Inactivated\"  given to Cornelia Real, or parent/legal guardian of  Cornelia Real and verbalized understanding. Patient responses:    Have you ever had a reaction to a flu vaccine? No  Are you able to eat eggs without adverse effects? Yes  Do you have any current illness? No  Have you ever had Guillian Strang Syndrome? No    Immunizations Administered     Name Date Dose Route    Influenza, Quadv, IM, PF (6 mo and older Fluzone, Flulaval, Fluarix, and 3 yrs and older Afluria) 12/9/2020 0.5 mL Intramuscular    Site: Deltoid- Left    Lot: [id number]    NDC: 69308-588-29          Flu vaccine given per order. Please see immunization tab. Patient instructed to remain in clinic for 20 minutes after injection and was advised to report any adverse reaction to me immediately.

## 2020-12-15 ENCOUNTER — TELEPHONE (OUTPATIENT)
Dept: PRIMARY CARE CLINIC | Age: 40
End: 2020-12-15

## 2020-12-15 NOTE — TELEPHONE ENCOUNTER
[employer] ask for PA to this med:    Eluxadoline (VIBERZI) 100 MG TABS [[id number]]  DISCONTINUED    Reference # [id number]

## 2020-12-17 ENCOUNTER — TELEPHONE (OUTPATIENT)
Dept: PRIMARY CARE CLINIC | Age: 40
End: 2020-12-17

## 2020-12-17 NOTE — TELEPHONE ENCOUNTER
Working on 0972 Paul Albarran has not recently prescribed this for the patient.  Went ahead and sent PA

## 2020-12-17 NOTE — TELEPHONE ENCOUNTER
April with [employer] called to check the status on the authorization  form that was faxed over on Dec 8th,    Eluxadoline (VIBERZI) 100 MG TABS [[id number]]  DISCONTINUED    Order Details  Dose: 100 mg Route: Oral Frequency: 2 TIMES DAILY   Dispense Quantity: 60 tablet Refills: 2          Sig: Take 100 mg by mouth 2 times daily for 90 days.             Thank you

## 2020-12-23 ENCOUNTER — OFFICE VISIT (OUTPATIENT)
Dept: PRIMARY CARE CLINIC | Age: 40
End: 2020-12-23
Payer: COMMERCIAL

## 2020-12-23 VITALS
DIASTOLIC BLOOD PRESSURE: 78 MMHG | OXYGEN SATURATION: 93 % | TEMPERATURE: 97.9 F | SYSTOLIC BLOOD PRESSURE: 126 MMHG | BODY MASS INDEX: 33.32 KG/M2 | WEIGHT: 225.6 LBS | HEART RATE: 104 BPM

## 2020-12-23 PROCEDURE — 99213 OFFICE O/P EST LOW 20 MIN: CPT | Performed by: FAMILY MEDICINE

## 2020-12-23 NOTE — PROGRESS NOTES
Subjective:      Patient ID: Kalyan Florez is a 36 y.o. male. HPINo hypoglycemia or LHedness  Rarely needs Ambien,hasn't had any in 2 mos  Review of Systems   Cardiovascular: Negative for palpitations. Neurological: Negative for light-headedness. PMSHx reviewed and/or updated    Objective:   Physical Exam  Cardiovascular:      Rate and Rhythm: Normal rate and regular rhythm. Pulmonary:      Effort: Pulmonary effort is normal.      Breath sounds: Normal breath sounds.        The 10-year ASCVD risk score (Muriel Jasso, et al., 2013) is: 1.9%    Values used to calculate the score:      Age: 36 years      Sex: Male      Is Non- : No      Diabetic: No      Tobacco smoker: No      Systolic Blood Pressure: 358 mmHg      Is BP treated: No      HDL Cholesterol: 43 mg/dL      Total Cholesterol: 237 mg/dL    Assessment:    Occasional insomnia  Hyperlipidemia  Reactive hypoglycemia      Plan:    get labs including hemoglobin electrophoresis  6 mos for BP check          Louise Elziabeth MD

## 2020-12-23 NOTE — TELEPHONE ENCOUNTER
Ask the patient if he still needs the Viberzi. Is he still having loose frequent stools now that his hemorrhoids have been fixed?

## 2021-02-04 ENCOUNTER — OFFICE VISIT (OUTPATIENT)
Dept: PRIMARY CARE CLINIC | Age: 41
End: 2021-02-04
Payer: COMMERCIAL

## 2021-02-04 VITALS
BODY MASS INDEX: 32.64 KG/M2 | SYSTOLIC BLOOD PRESSURE: 130 MMHG | HEART RATE: 100 BPM | WEIGHT: 221 LBS | TEMPERATURE: 97 F | DIASTOLIC BLOOD PRESSURE: 87 MMHG

## 2021-02-04 DIAGNOSIS — F51.01 PRIMARY INSOMNIA: Primary | ICD-10-CM

## 2021-02-04 PROCEDURE — G8417 CALC BMI ABV UP PARAM F/U: HCPCS | Performed by: FAMILY MEDICINE

## 2021-02-04 PROCEDURE — G8482 FLU IMMUNIZE ORDER/ADMIN: HCPCS | Performed by: FAMILY MEDICINE

## 2021-02-04 PROCEDURE — 99213 OFFICE O/P EST LOW 20 MIN: CPT | Performed by: FAMILY MEDICINE

## 2021-02-04 PROCEDURE — G8427 DOCREV CUR MEDS BY ELIG CLIN: HCPCS | Performed by: FAMILY MEDICINE

## 2021-02-04 PROCEDURE — 1036F TOBACCO NON-USER: CPT | Performed by: FAMILY MEDICINE

## 2021-02-04 RX ORDER — ZOLPIDEM TARTRATE 10 MG/1
10 TABLET ORAL NIGHTLY PRN
Qty: 90 TABLET | Refills: 0 | Status: SHIPPED | OUTPATIENT
Start: 2021-02-04 | End: 2021-05-05

## 2021-02-04 RX ORDER — ESCITALOPRAM OXALATE 20 MG/1
20 TABLET ORAL DAILY
Qty: 90 TABLET | Refills: 1 | COMMUNITY
Start: 2021-02-04

## 2021-02-04 NOTE — PROGRESS NOTES
Farideh Madera (:  1980) is a 36 y.o. male,Established patient, here for evaluation of the following chief complaint(s): Insomnia      ASSESSMENT/PLAN:  Primary insomnia    Refill the Ambien    SUBJECTIVE/OBJECTIVE:  HPITried to go without the Ambien for 2 consecutive nights, did not sleep the whole time, was not able to \" shut off my thoughts\". Has tried trazodone, did not work, was awake. Review of Systems    Physical Exam  Constitutional:       Appearance: Normal appearance. Neck:      Musculoskeletal: Normal range of motion. Thyroid: No thyromegaly. Vascular: No carotid bruit. Cardiovascular:      Rate and Rhythm: Normal rate and regular rhythm. Pulmonary:      Effort: Pulmonary effort is normal.      Breath sounds: Normal breath sounds. Lymphadenopathy:      Cervical: No cervical adenopathy. Neurological:      Mental Status: He is alert. Vitals:    21 1043   BP: 130/87   Pulse: 100   Temp: 97 °F (36.1 °C)   Weight: 221 lb (100.2 kg)               An electronic signature was used to authenticate this note.     --Rayray Mcgarry MD

## 2021-05-04 ENCOUNTER — OFFICE VISIT (OUTPATIENT)
Dept: PRIMARY CARE CLINIC | Age: 41
End: 2021-05-04
Payer: COMMERCIAL

## 2021-05-04 VITALS
SYSTOLIC BLOOD PRESSURE: 118 MMHG | HEART RATE: 81 BPM | BODY MASS INDEX: 32.05 KG/M2 | DIASTOLIC BLOOD PRESSURE: 81 MMHG | WEIGHT: 217 LBS

## 2021-05-04 DIAGNOSIS — K13.0 ANGULAR CHEILITIS: Primary | ICD-10-CM

## 2021-05-04 PROCEDURE — G8417 CALC BMI ABV UP PARAM F/U: HCPCS | Performed by: FAMILY MEDICINE

## 2021-05-04 PROCEDURE — G8427 DOCREV CUR MEDS BY ELIG CLIN: HCPCS | Performed by: FAMILY MEDICINE

## 2021-05-04 PROCEDURE — 1036F TOBACCO NON-USER: CPT | Performed by: FAMILY MEDICINE

## 2021-05-04 PROCEDURE — 99212 OFFICE O/P EST SF 10 MIN: CPT | Performed by: FAMILY MEDICINE

## 2021-05-04 ASSESSMENT — ENCOUNTER SYMPTOMS
DIARRHEA: 0
CONSTIPATION: 0

## 2021-05-05 ENCOUNTER — APPOINTMENT (RX ONLY)
Dept: URBAN - METROPOLITAN AREA CLINIC 170 | Facility: CLINIC | Age: 41
Setting detail: DERMATOLOGY
End: 2021-05-05

## 2021-05-05 DIAGNOSIS — L259 CONTACT DERMATITIS AND OTHER ECZEMA, UNSPECIFIED CAUSE: ICD-10-CM | Status: INADEQUATELY CONTROLLED

## 2021-05-05 PROBLEM — L23.9 ALLERGIC CONTACT DERMATITIS, UNSPECIFIED CAUSE: Status: ACTIVE | Noted: 2021-05-05

## 2021-05-05 PROCEDURE — ? PRESCRIPTION MEDICATION MANAGEMENT

## 2021-05-05 PROCEDURE — ? COUNSELING

## 2021-05-05 PROCEDURE — ? PRESCRIPTION

## 2021-05-05 PROCEDURE — ? ADDITIONAL NOTES

## 2021-05-05 PROCEDURE — 99204 OFFICE O/P NEW MOD 45 MIN: CPT

## 2021-05-05 RX ORDER — DESONIDE 0.5 MG/G
OINTMENT TOPICAL
Qty: 1 | Refills: 1 | Status: ERX | COMMUNITY
Start: 2021-05-05

## 2021-05-05 RX ADMIN — DESONIDE ONE: 0.5 OINTMENT TOPICAL at 00:00

## 2021-05-05 ASSESSMENT — LOCATION SIMPLE DESCRIPTION DERM
LOCATION SIMPLE: LEFT UPPER LIP
LOCATION SIMPLE: LEFT LIP
LOCATION SIMPLE: LEFT CHEEK
LOCATION SIMPLE: RIGHT CHEEK

## 2021-05-05 ASSESSMENT — LOCATION DETAILED DESCRIPTION DERM
LOCATION DETAILED: LEFT SUPERIOR VERMILION BORDER
LOCATION DETAILED: LEFT SUPERIOR MEDIAL BUCCAL CHEEK
LOCATION DETAILED: RIGHT SUPERIOR MEDIAL BUCCAL CHEEK
LOCATION DETAILED: LEFT LOWER CUTANEOUS LIP

## 2021-05-05 ASSESSMENT — SEVERITY ASSESSMENT: SEVERITY: MILD TO MODERATE

## 2021-05-05 ASSESSMENT — LOCATION ZONE DERM
LOCATION ZONE: LIP
LOCATION ZONE: FACE

## 2021-05-05 NOTE — HPI: RASH
What Type Of Note Output Would You Prefer (Optional)?: Bullet Format
How Severe Is Your Rash?: moderate
Is This A New Presentation, Or A Follow-Up?: Rash
Additional History: Patient states he’s tried Aquaphor, Cerave and an OTC ecxzema cream, patient states the corners of his mouth crack and bleed, he works from home and doesn’t wear a mask all  day

## 2021-05-05 NOTE — PROCEDURE: PRESCRIPTION MEDICATION MANAGEMENT
Samples Given: Vanicream facial cleansers
Render In Strict Bullet Format?: No
Detail Level: Detailed
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 Pt stopped taking biotin supplement that had capsule made of beeswax but still inflamed.

## 2021-05-19 ENCOUNTER — APPOINTMENT (RX ONLY)
Dept: URBAN - METROPOLITAN AREA CLINIC 170 | Facility: CLINIC | Age: 41
Setting detail: DERMATOLOGY
End: 2021-05-19

## 2021-05-19 DIAGNOSIS — L259 CONTACT DERMATITIS AND OTHER ECZEMA, UNSPECIFIED CAUSE: ICD-10-CM

## 2021-05-19 PROBLEM — L23.9 ALLERGIC CONTACT DERMATITIS, UNSPECIFIED CAUSE: Status: ACTIVE | Noted: 2021-05-19

## 2021-05-19 PROCEDURE — ? PATCH TESTING

## 2021-05-19 PROCEDURE — 95044 PATCH/APPLICATION TESTS: CPT

## 2021-05-19 PROCEDURE — ? COUNSELING

## 2021-05-19 PROCEDURE — ? ADDITIONAL NOTES

## 2021-05-19 ASSESSMENT — LOCATION DETAILED DESCRIPTION DERM
LOCATION DETAILED: LEFT LOWER CUTANEOUS LIP
LOCATION DETAILED: LEFT SUPERIOR VERMILION BORDER
LOCATION DETAILED: RIGHT SUPERIOR MEDIAL BUCCAL CHEEK
LOCATION DETAILED: LEFT SUPERIOR MEDIAL BUCCAL CHEEK

## 2021-05-19 ASSESSMENT — LOCATION SIMPLE DESCRIPTION DERM
LOCATION SIMPLE: LEFT UPPER LIP
LOCATION SIMPLE: LEFT CHEEK
LOCATION SIMPLE: LEFT LIP
LOCATION SIMPLE: RIGHT CHEEK

## 2021-05-19 ASSESSMENT — LOCATION ZONE DERM
LOCATION ZONE: LIP
LOCATION ZONE: FACE

## 2021-05-21 ENCOUNTER — APPOINTMENT (RX ONLY)
Dept: URBAN - METROPOLITAN AREA CLINIC 170 | Facility: CLINIC | Age: 41
Setting detail: DERMATOLOGY
End: 2021-05-21

## 2021-05-21 DIAGNOSIS — L259 CONTACT DERMATITIS AND OTHER ECZEMA, UNSPECIFIED CAUSE: ICD-10-CM

## 2021-05-21 PROBLEM — L23.9 ALLERGIC CONTACT DERMATITIS, UNSPECIFIED CAUSE: Status: ACTIVE | Noted: 2021-05-21

## 2021-05-21 PROCEDURE — ? ADDITIONAL NOTES

## 2021-05-21 PROCEDURE — ? TRUE TEST READING

## 2021-05-21 NOTE — PROCEDURE: TRUE TEST READING
Epoxy Resin: no reaction
Show Allergen Counseling In The Note?: Yes
Thimerosal Counseling: True Test handout given.
Number Of Patches Read: 36
Black Rubber Mix: 1+
What Reading Time Point?: 48 hour
Detail Level: Zone

## 2021-05-24 ENCOUNTER — APPOINTMENT (RX ONLY)
Dept: URBAN - METROPOLITAN AREA CLINIC 170 | Facility: CLINIC | Age: 41
Setting detail: DERMATOLOGY
End: 2021-05-24

## 2021-05-24 DIAGNOSIS — L259 CONTACT DERMATITIS AND OTHER ECZEMA, UNSPECIFIED CAUSE: ICD-10-CM | Status: INADEQUATELY CONTROLLED

## 2021-05-24 PROBLEM — L23.9 ALLERGIC CONTACT DERMATITIS, UNSPECIFIED CAUSE: Status: ACTIVE | Noted: 2021-05-24

## 2021-05-24 PROCEDURE — ? EDUCATIONAL RESOURCES PROVIDED

## 2021-05-24 PROCEDURE — ? PRESCRIPTION

## 2021-05-24 PROCEDURE — ? MEDICATION COUNSELING

## 2021-05-24 PROCEDURE — ? PHOTO-DOCUMENTATION

## 2021-05-24 PROCEDURE — ? TRUE TEST READING

## 2021-05-24 PROCEDURE — ? COUNSELING

## 2021-05-24 PROCEDURE — 99214 OFFICE O/P EST MOD 30 MIN: CPT

## 2021-05-24 PROCEDURE — ? ADDITIONAL NOTES

## 2021-05-24 PROCEDURE — ? PRESCRIPTION MEDICATION MANAGEMENT

## 2021-05-24 RX ORDER — TACROLIMUS 1 MG/G
OINTMENT TOPICAL
Qty: 1 | Refills: 2 | Status: ERX | COMMUNITY
Start: 2021-05-24

## 2021-05-24 RX ADMIN — TACROLIMUS: 1 OINTMENT TOPICAL at 00:00

## 2021-05-24 ASSESSMENT — LOCATION DETAILED DESCRIPTION DERM
LOCATION DETAILED: RIGHT INFERIOR VERMILION LIP
LOCATION DETAILED: LEFT INFERIOR VERMILION LIP
LOCATION DETAILED: RIGHT ORAL COMMISSURE
LOCATION DETAILED: RIGHT SUPERIOR VERMILION LIP
LOCATION DETAILED: LEFT SUPERIOR VERMILION LIP

## 2021-05-24 ASSESSMENT — LOCATION SIMPLE DESCRIPTION DERM
LOCATION SIMPLE: LEFT LIP
LOCATION SIMPLE: RIGHT LIP

## 2021-05-24 ASSESSMENT — LOCATION ZONE DERM: LOCATION ZONE: LIP

## 2021-05-24 NOTE — PROCEDURE: MEDICATION COUNSELING
Xeljoeyz Pregnancy And Lactation Text: This medication is Pregnancy Category D and is not considered safe during pregnancy.  The risk during breast feeding is also uncertain.

## 2021-05-24 NOTE — PROCEDURE: PRESCRIPTION MEDICATION MANAGEMENT
Detail Level: Detailed
Initiate Treatment: Restart zyrtec BID. Desonide oint BID x up to 2 weeks during current flare.
Render In Strict Bullet Format?: No

## 2021-05-24 NOTE — PROCEDURE: TRUE TEST READING
Epoxy Resin: no reaction
Disperse Blue 106: 1+
Show Allergen Counseling In The Note?: Yes
Thimerosal Counseling: True Test handout given.
Number Of Patches Read: 36
What Reading Time Point?: 120 hour
Detail Level: Zone

## 2021-07-12 RX ORDER — AMLODIPINE BESYLATE 5 MG/1
5 TABLET ORAL DAILY
Qty: 90 TABLET | Refills: 1 | Status: SHIPPED | OUTPATIENT
Start: 2021-07-12

## 2021-07-12 NOTE — TELEPHONE ENCOUNTER
Medication:   Requested Prescriptions     Pending Prescriptions Disp Refills    amLODIPine (NORVASC) 5 MG tablet 90 tablet 1     Sig: Take 1 tablet by mouth daily     Last Filled:1.12.21  Last appt: 5/4/2021   Next appt: Visit date not found    Last OARRS: No flowsheet data found.

## 2021-07-12 NOTE — TELEPHONE ENCOUNTER
Medication:   Requested Prescriptions     Pending Prescriptions Disp Refills    amLODIPine (NORVASC) 5 MG tablet 90 tablet 1     Sig: Take 1 tablet by mouth daily     Last Filled:  01/12/21    Last appt: 5/4/2021   Next appt: Visit date not found    Last OARRS: No flowsheet data found.